# Patient Record
Sex: FEMALE | Race: WHITE | Employment: OTHER | URBAN - METROPOLITAN AREA
[De-identification: names, ages, dates, MRNs, and addresses within clinical notes are randomized per-mention and may not be internally consistent; named-entity substitution may affect disease eponyms.]

---

## 2023-11-19 ENCOUNTER — HOSPITAL ENCOUNTER (OUTPATIENT)
Age: 58
Discharge: HOME | End: 2023-11-19
Payer: COMMERCIAL

## 2023-11-19 VITALS
RESPIRATION RATE: 20 BRPM | TEMPERATURE: 99 F | HEART RATE: 89 BPM | DIASTOLIC BLOOD PRESSURE: 86 MMHG | SYSTOLIC BLOOD PRESSURE: 146 MMHG | OXYGEN SATURATION: 98 %

## 2023-11-19 DIAGNOSIS — J01.90 ACUTE NON-RECURRENT SINUSITIS, UNSPECIFIED LOCATION: Primary | ICD-10-CM

## 2023-11-19 LAB — S PYO AG THROAT QL IA.RAPID: NEGATIVE

## 2023-11-19 PROCEDURE — 99203 OFFICE O/P NEW LOW 30 MIN: CPT

## 2023-11-19 PROCEDURE — 87651 STREP A DNA AMP PROBE: CPT | Performed by: NURSE PRACTITIONER

## 2023-11-19 RX ORDER — AMOXICILLIN AND CLAVULANATE POTASSIUM 875; 125 MG/1; MG/1
1 TABLET, FILM COATED ORAL 2 TIMES DAILY
Qty: 20 TABLET | Refills: 0 | Status: SHIPPED | OUTPATIENT
Start: 2023-11-19 | End: 2023-11-29

## 2024-09-25 ENCOUNTER — HOSPITAL ENCOUNTER (OUTPATIENT)
Age: 59
Discharge: HOME | End: 2024-09-25
Attending: EMERGENCY MEDICINE
Payer: COMMERCIAL

## 2024-09-25 VITALS
OXYGEN SATURATION: 96 % | SYSTOLIC BLOOD PRESSURE: 135 MMHG | BODY MASS INDEX: 24.84 KG/M2 | WEIGHT: 135 LBS | HEART RATE: 90 BPM | TEMPERATURE: 98 F | HEIGHT: 62 IN | DIASTOLIC BLOOD PRESSURE: 78 MMHG | RESPIRATION RATE: 16 BRPM

## 2024-09-25 DIAGNOSIS — H10.11 ALLERGIC CONJUNCTIVITIS OF RIGHT EYE: Primary | ICD-10-CM

## 2024-09-25 PROCEDURE — 99212 OFFICE O/P EST SF 10 MIN: CPT

## 2024-09-25 PROCEDURE — 99213 OFFICE O/P EST LOW 20 MIN: CPT

## 2024-09-25 RX ORDER — OLOPATADINE HYDROCHLORIDE 2 MG/ML
1 SOLUTION/ DROPS OPHTHALMIC DAILY
Qty: 2.5 ML | Refills: 0 | Status: SHIPPED | OUTPATIENT
Start: 2024-09-25

## 2024-09-25 RX ORDER — ATORVASTATIN CALCIUM 10 MG/1
10 TABLET, FILM COATED ORAL EVERY EVENING
COMMUNITY
Start: 2024-09-06

## 2024-09-25 NOTE — ED PROVIDER NOTES
Patient Seen in: [facility]      History     Chief Complaint   Patient presents with    Eye Visual Problem     Stated Complaint: right eye infection    Subjective:   HPI    Since Monday right eye feels irritated, itchy and like there is a film over the right eye. It occasionally clears. No vision los. Today there is some discharge from the right eye. No recent illness or trauma.     Objective:   Past Medical History:    Allergic rhinitis    Hypothyroidism              Past Surgical History:   Procedure Laterality Date    Tonsillectomy                  No pertinent social history.            Review of Systems    Positive for stated Chief Complaint: Eye Visual Problem    Other systems are as noted in HPI.  Constitutional and vital signs reviewed.      All other systems reviewed and negative except as noted above.    Physical Exam     ED Triage Vitals [09/25/24 1746]   /78   Pulse 90   Resp 16   Temp 98.1 °F (36.7 °C)   Temp src Oral   SpO2 96 %   O2 Device None (Room air)       Current Vitals:   Vital Signs  BP: 135/78  Pulse: 90  Resp: 16  Temp: 98.1 °F (36.7 °C)  Temp src: Oral    Oxygen Therapy  SpO2: 96 %  O2 Device: None (Room air)        Right Eye Chart Acuity: 20/30, Uncorrected  Left Eye Chart Acuity: 20/40, Uncorrected    Physical Exam  Vitals and nursing note reviewed.   Constitutional:       Appearance: Normal appearance. She is well-developed.   HENT:      Head: Normocephalic and atraumatic.   Cardiovascular:      Rate and Rhythm: Normal rate and regular rhythm.   Pulmonary:      Effort: Pulmonary effort is normal. No respiratory distress.   Skin:     General: Skin is warm and dry.      Capillary Refill: Capillary refill takes less than 2 seconds.   Neurological:      General: No focal deficit present.      Mental Status: She is alert.      Sensory: No sensory deficit.   Psychiatric:         Mood and Affect: Mood normal.         Behavior: Behavior normal.              ED Course    Labs Reviewed - No data to display                   MDM                                      Medical Decision Making  Allergic vs viral conjunctivitis. Symptoms consistent with allergic etiology. Patient appears well and has no vision loss in IC. Discharge home on topical and oral antihistamine and follow up with optho if worse    Disposition and Plan     Clinical Impression:  1. Allergic conjunctivitis of right eye         Disposition:  Discharge  9/25/2024  6:14 pm    Follow-up:  Homer Hagen MD  [facility] [place] [address]  [phone]      As needed          Medications Prescribed:  Current Discharge Medication List        START taking these medications    Details   Olopatadine HCl 0.2 % Ophthalmic Solution Apply 1 drop to eye daily.  Qty: 2.5 mL, Refills: 0

## 2024-09-25 NOTE — DISCHARGE INSTRUCTIONS
Follow up with ophthalmology, call tomorrow for appointment.   To ER for any new or worsening symptoms such as vision loss.   Pataday as prescribed

## 2024-09-25 NOTE — ED INITIAL ASSESSMENT (HPI)
Pt here c/o rt eye blurry vision with drainage and started on Monday.   Started seeing floaters to vision in bilateral eyes since Monday, but states has seen floaters previously.   Works at a  and had a child cough/sneeze to eye.